# Patient Record
Sex: FEMALE | URBAN - METROPOLITAN AREA
[De-identification: names, ages, dates, MRNs, and addresses within clinical notes are randomized per-mention and may not be internally consistent; named-entity substitution may affect disease eponyms.]

---

## 2017-07-03 ENCOUNTER — EMERGENCY (EMERGENCY)
Facility: HOSPITAL | Age: 22
LOS: 1 days | Discharge: HOME | End: 2017-07-03
Attending: EMERGENCY MEDICINE | Admitting: EMERGENCY MEDICINE
Payer: MEDICAID

## 2017-07-03 VITALS
RESPIRATION RATE: 18 BRPM | OXYGEN SATURATION: 100 % | TEMPERATURE: 99 F | HEART RATE: 79 BPM | SYSTOLIC BLOOD PRESSURE: 106 MMHG | DIASTOLIC BLOOD PRESSURE: 73 MMHG

## 2017-07-03 PROCEDURE — 99283 EMERGENCY DEPT VISIT LOW MDM: CPT | Mod: 25

## 2017-07-03 PROCEDURE — 64400 NJX AA&/STRD TRIGEMINAL NRV: CPT | Mod: 26

## 2017-07-04 PROCEDURE — 64400 NJX AA&/STRD TRIGEMINAL NRV: CPT | Mod: LT

## 2017-07-04 PROCEDURE — 99283 EMERGENCY DEPT VISIT LOW MDM: CPT | Mod: 25

## 2017-07-04 RX ORDER — OXYCODONE HYDROCHLORIDE 5 MG/1
5 TABLET ORAL ONCE
Qty: 0 | Refills: 0 | Status: DISCONTINUED | OUTPATIENT
Start: 2017-07-04 | End: 2017-07-04

## 2017-07-04 RX ORDER — OXYCODONE HYDROCHLORIDE 5 MG/1
1 TABLET ORAL
Qty: 4 | Refills: 0 | OUTPATIENT
Start: 2017-07-04 | End: 2017-07-05

## 2017-07-04 RX ADMIN — OXYCODONE HYDROCHLORIDE 5 MILLIGRAM(S): 5 TABLET ORAL at 02:03

## 2017-07-04 NOTE — ED PROVIDER NOTE - PLAN OF CARE
Take Oxycodone 5mg 1 tablets every 6 hrs as needed for breakthrough discomfort- caution drowsiness while taking this medication- do not drive or operate heavy machinery. Take Motrin 600mg every 8hrs with food for pain Follow up with Dental Clinic (see below). Return to the emergency room for any worsening pain, fever, new symptoms or any other concern.     [facility]  8:30 a.m. to 4:30 p.m.   [address]  [phone]

## 2017-07-04 NOTE — ED PROVIDER NOTE - MEDICAL DECISION MAKING DETAILS
Tooth pain - large fely appreciated. low suspicion for abscess. Tooth pain - large fely appreciated. low suspicion for abscess.  Attending Celeste: 23 y/o female presenting with dental pain. no swelling or fluctuence seen to suggest abscess. no jaw discomfort. concern for dental caries. will pain control and have pt follow up with dental

## 2017-07-04 NOTE — ED PROVIDER NOTE - OBJECTIVE STATEMENT
21yo F co of dental pain. Pt states pain has had intermittent pain in lower left jaw that recently became constant and is increasing over the last day. No fevers or chills. Pt has no dentist currently but states she has a cavity in the area which she knows requires a root canal. Pt is otherwise well. No trismus, ear pain, pain with eye movement or facial swelling. No stridor or difficulty breathing. No neck swelling. Pt has taken tylenol and motrin without relief.

## 2017-07-04 NOTE — ED PROVIDER NOTE - CARE PLAN
Principal Discharge DX:	Odontalgia  Instructions for follow-up, activity and diet:	Take Oxycodone 5mg 1 tablets every 6 hrs as needed for breakthrough discomfort- caution drowsiness while taking this medication- do not drive or operate heavy machinery. Take Motrin 600mg every 8hrs with food for pain Follow up with Dental Clinic (see below). Return to the emergency room for any worsening pain, fever, new symptoms or any other concern.     [facility]  8:30 a.m. to 4:30 p.m.   [address] [place] [address]  [phone] Principal Discharge DX:	Odontalgia  Instructions for follow-up, activity and diet:	Take Oxycodone 5mg 1 tablets every 6 hrs as needed for breakthrough discomfort- caution drowsiness while taking this medication- do not drive or operate heavy machinery. Take Motrin 600mg every 8hrs with food for pain Follow up with Dental Clinic (see below). Return to the emergency room for any worsening pain, fever, new symptoms or any other concern.     [facility]  8:30 a.m. to 4:30 p.m.   [address] [place] [address]  [phone] Principal Discharge DX:	Odontalgia  Instructions for follow-up, activity and diet:	Take Oxycodone 5mg 1 tablets every 6 hrs as needed for breakthrough discomfort- caution drowsiness while taking this medication- do not drive or operate heavy machinery. Take Motrin 600mg every 8hrs with food for pain Follow up with Dental Clinic (see below). Return to the emergency room for any worsening pain, fever, new symptoms or any other concern.     [facility]  8:30 a.m. to 4:30 p.m.   [address] [place] [address]  [phone]

## 2017-07-04 NOTE — ED PROVIDER NOTE - PHYSICAL EXAMINATION
Mod distress 2/2 pain. NCAT. EOMI, PERRLA, dental fely on tooth twenty. No gingival erythema or swelling. OP without exudate or erythema. no tonsilar swelling. neck supple. ctabl. rrr Mod distress 2/2 pain. NCAT. EOMI, PERRLA, dental fely on tooth twenty. No gingival erythema or swelling. OP without exudate or erythema. no tonsilar swelling. neck supple. ctabl. rrr  Attending Celeste: Gen: NAD, heent: atrauamtic, eomi, perrla, mmm, , +dental caries to tooth 18, gum fullness to 19. to left lower op pink, uvula midline, neck; nttp, no nuchal rigidity, chest: nttp, no crepitus, cv: rrr, no murmurs, lungs: ctab, abd: soft, nontender, nondistended, no peritoneal signs, +BS, no guarding, ext: wwp, neg homans, skin: no rash, neuro: awake and alert, following commands, speech clear, sensation and strength intact, no focal deficits

## 2018-07-15 ENCOUNTER — TRANSCRIPTION ENCOUNTER (OUTPATIENT)
Age: 23
End: 2018-07-15

## 2022-04-04 PROBLEM — Z00.00 ENCOUNTER FOR PREVENTIVE HEALTH EXAMINATION: Status: ACTIVE | Noted: 2022-04-04

## 2022-04-06 ENCOUNTER — ASOB RESULT (OUTPATIENT)
Age: 27
End: 2022-04-06

## 2022-04-06 ENCOUNTER — TRANSCRIPTION ENCOUNTER (OUTPATIENT)
Age: 27
End: 2022-04-06

## 2022-04-06 ENCOUNTER — APPOINTMENT (OUTPATIENT)
Dept: ANTEPARTUM | Facility: CLINIC | Age: 27
End: 2022-04-06
Payer: MEDICAID

## 2022-04-06 PROCEDURE — 76813 OB US NUCHAL MEAS 1 GEST: CPT

## 2022-04-06 PROCEDURE — 76801 OB US < 14 WKS SINGLE FETUS: CPT | Mod: 59

## 2022-05-23 ENCOUNTER — APPOINTMENT (OUTPATIENT)
Dept: ANTEPARTUM | Facility: CLINIC | Age: 27
End: 2022-05-23

## 2022-05-23 ENCOUNTER — ASOB RESULT (OUTPATIENT)
Age: 27
End: 2022-05-23

## 2022-05-23 PROCEDURE — 76811 OB US DETAILED SNGL FETUS: CPT

## 2022-06-06 ENCOUNTER — ASOB RESULT (OUTPATIENT)
Age: 27
End: 2022-06-06

## 2022-06-06 ENCOUNTER — APPOINTMENT (OUTPATIENT)
Dept: ANTEPARTUM | Facility: CLINIC | Age: 27
End: 2022-06-06
Payer: MEDICAID

## 2022-06-06 PROCEDURE — 76816 OB US FOLLOW-UP PER FETUS: CPT

## 2022-08-22 ENCOUNTER — ASOB RESULT (OUTPATIENT)
Age: 27
End: 2022-08-22

## 2022-08-22 ENCOUNTER — APPOINTMENT (OUTPATIENT)
Dept: ANTEPARTUM | Facility: CLINIC | Age: 27
End: 2022-08-22

## 2022-08-22 PROCEDURE — 76818 FETAL BIOPHYS PROFILE W/NST: CPT

## 2022-08-22 PROCEDURE — 76816 OB US FOLLOW-UP PER FETUS: CPT

## 2022-08-22 PROCEDURE — 76820 UMBILICAL ARTERY ECHO: CPT

## 2022-08-22 PROCEDURE — 99202 OFFICE O/P NEW SF 15 MIN: CPT | Mod: TH,25

## 2022-09-02 ENCOUNTER — APPOINTMENT (OUTPATIENT)
Dept: ANTEPARTUM | Facility: CLINIC | Age: 27
End: 2022-09-02

## 2022-09-19 ENCOUNTER — ASOB RESULT (OUTPATIENT)
Age: 27
End: 2022-09-19

## 2022-09-19 ENCOUNTER — APPOINTMENT (OUTPATIENT)
Dept: ANTEPARTUM | Facility: CLINIC | Age: 27
End: 2022-09-19

## 2022-09-19 PROCEDURE — 76820 UMBILICAL ARTERY ECHO: CPT | Mod: 59

## 2022-09-19 PROCEDURE — 76818 FETAL BIOPHYS PROFILE W/NST: CPT

## 2022-09-19 PROCEDURE — 76816 OB US FOLLOW-UP PER FETUS: CPT | Mod: 59

## 2022-10-08 ENCOUNTER — INPATIENT (INPATIENT)
Facility: HOSPITAL | Age: 27
LOS: 2 days | Discharge: HOME | End: 2022-10-11
Attending: OBSTETRICS & GYNECOLOGY | Admitting: OBSTETRICS & GYNECOLOGY

## 2022-10-09 ENCOUNTER — TRANSCRIPTION ENCOUNTER (OUTPATIENT)
Age: 27
End: 2022-10-09

## 2022-10-09 VITALS — TEMPERATURE: 98 F

## 2022-10-09 DIAGNOSIS — Z90.89 ACQUIRED ABSENCE OF OTHER ORGANS: Chronic | ICD-10-CM

## 2022-10-09 DIAGNOSIS — Z98.890 OTHER SPECIFIED POSTPROCEDURAL STATES: Chronic | ICD-10-CM

## 2022-10-09 LAB
BASOPHILS # BLD AUTO: 0.03 K/UL — SIGNIFICANT CHANGE UP (ref 0–0.2)
BASOPHILS NFR BLD AUTO: 0.3 % — SIGNIFICANT CHANGE UP (ref 0–2)
BLD GP AB SCN SERPL QL: NEGATIVE — SIGNIFICANT CHANGE UP
COVID-19 SPIKE DOMAIN AB INTERP: POSITIVE
COVID-19 SPIKE DOMAIN ANTIBODY RESULT: >250 U/ML — HIGH
EOSINOPHIL # BLD AUTO: 0.04 K/UL — SIGNIFICANT CHANGE UP (ref 0–0.5)
EOSINOPHIL NFR BLD AUTO: 0.5 % — SIGNIFICANT CHANGE UP (ref 0–6)
HCT VFR BLD CALC: 35.3 % — SIGNIFICANT CHANGE UP (ref 34.5–45)
HGB BLD-MCNC: 10.9 G/DL — LOW (ref 11.5–15.5)
IANC: 5.8 K/UL — SIGNIFICANT CHANGE UP (ref 1.8–7.4)
IMM GRANULOCYTES NFR BLD AUTO: 0.5 % — SIGNIFICANT CHANGE UP (ref 0–0.9)
LYMPHOCYTES # BLD AUTO: 1.79 K/UL — SIGNIFICANT CHANGE UP (ref 1–3.3)
LYMPHOCYTES # BLD AUTO: 20.8 % — SIGNIFICANT CHANGE UP (ref 13–44)
MCHC RBC-ENTMCNC: 26.1 PG — LOW (ref 27–34)
MCHC RBC-ENTMCNC: 30.9 GM/DL — LOW (ref 32–36)
MCV RBC AUTO: 84.7 FL — SIGNIFICANT CHANGE UP (ref 80–100)
MONOCYTES # BLD AUTO: 0.89 K/UL — SIGNIFICANT CHANGE UP (ref 0–0.9)
MONOCYTES NFR BLD AUTO: 10.4 % — SIGNIFICANT CHANGE UP (ref 2–14)
NEUTROPHILS # BLD AUTO: 5.8 K/UL — SIGNIFICANT CHANGE UP (ref 1.8–7.4)
NEUTROPHILS NFR BLD AUTO: 67.5 % — SIGNIFICANT CHANGE UP (ref 43–77)
NRBC # BLD: 0 /100 WBCS — SIGNIFICANT CHANGE UP (ref 0–0)
NRBC # FLD: 0 K/UL — SIGNIFICANT CHANGE UP (ref 0–0)
PLATELET # BLD AUTO: 207 K/UL — SIGNIFICANT CHANGE UP (ref 150–400)
RBC # BLD: 4.17 M/UL — SIGNIFICANT CHANGE UP (ref 3.8–5.2)
RBC # FLD: 15.5 % — HIGH (ref 10.3–14.5)
RH IG SCN BLD-IMP: POSITIVE — SIGNIFICANT CHANGE UP
RH IG SCN BLD-IMP: POSITIVE — SIGNIFICANT CHANGE UP
SARS-COV-2 IGG+IGM SERPL QL IA: >250 U/ML — HIGH
SARS-COV-2 IGG+IGM SERPL QL IA: POSITIVE
SARS-COV-2 RNA SPEC QL NAA+PROBE: SIGNIFICANT CHANGE UP
T PALLIDUM AB TITR SER: NEGATIVE — SIGNIFICANT CHANGE UP
WBC # BLD: 8.59 K/UL — SIGNIFICANT CHANGE UP (ref 3.8–10.5)
WBC # FLD AUTO: 8.59 K/UL — SIGNIFICANT CHANGE UP (ref 3.8–10.5)

## 2022-10-09 RX ORDER — BENZOCAINE 10 %
1 GEL (GRAM) MUCOUS MEMBRANE EVERY 6 HOURS
Refills: 0 | Status: DISCONTINUED | OUTPATIENT
Start: 2022-10-09 | End: 2022-10-11

## 2022-10-09 RX ORDER — CITRIC ACID/SODIUM CITRATE 300-500 MG
15 SOLUTION, ORAL ORAL EVERY 6 HOURS
Refills: 0 | Status: DISCONTINUED | OUTPATIENT
Start: 2022-10-09 | End: 2022-10-09

## 2022-10-09 RX ORDER — OXYCODONE HYDROCHLORIDE 5 MG/1
5 TABLET ORAL ONCE
Refills: 0 | Status: DISCONTINUED | OUTPATIENT
Start: 2022-10-09 | End: 2022-10-11

## 2022-10-09 RX ORDER — AER TRAVELER 0.5 G/1
1 SOLUTION RECTAL; TOPICAL EVERY 4 HOURS
Refills: 0 | Status: DISCONTINUED | OUTPATIENT
Start: 2022-10-09 | End: 2022-10-11

## 2022-10-09 RX ORDER — OXYCODONE HYDROCHLORIDE 5 MG/1
5 TABLET ORAL
Refills: 0 | Status: DISCONTINUED | OUTPATIENT
Start: 2022-10-09 | End: 2022-10-11

## 2022-10-09 RX ORDER — OXYTOCIN 10 UNIT/ML
333.33 VIAL (ML) INJECTION
Qty: 20 | Refills: 0 | Status: DISCONTINUED | OUTPATIENT
Start: 2022-10-09 | End: 2022-10-09

## 2022-10-09 RX ORDER — OXYTOCIN 10 UNIT/ML
333.33 VIAL (ML) INJECTION
Qty: 20 | Refills: 0 | Status: DISCONTINUED | OUTPATIENT
Start: 2022-10-09 | End: 2022-10-11

## 2022-10-09 RX ORDER — MAGNESIUM HYDROXIDE 400 MG/1
30 TABLET, CHEWABLE ORAL
Refills: 0 | Status: DISCONTINUED | OUTPATIENT
Start: 2022-10-09 | End: 2022-10-11

## 2022-10-09 RX ORDER — SODIUM CHLORIDE 9 MG/ML
3 INJECTION INTRAMUSCULAR; INTRAVENOUS; SUBCUTANEOUS EVERY 8 HOURS
Refills: 0 | Status: DISCONTINUED | OUTPATIENT
Start: 2022-10-09 | End: 2022-10-11

## 2022-10-09 RX ORDER — PRAMOXINE HYDROCHLORIDE 150 MG/15G
1 AEROSOL, FOAM RECTAL EVERY 4 HOURS
Refills: 0 | Status: DISCONTINUED | OUTPATIENT
Start: 2022-10-09 | End: 2022-10-11

## 2022-10-09 RX ORDER — LANOLIN
1 OINTMENT (GRAM) TOPICAL EVERY 6 HOURS
Refills: 0 | Status: DISCONTINUED | OUTPATIENT
Start: 2022-10-09 | End: 2022-10-11

## 2022-10-09 RX ORDER — TETANUS TOXOID, REDUCED DIPHTHERIA TOXOID AND ACELLULAR PERTUSSIS VACCINE, ADSORBED 5; 2.5; 8; 8; 2.5 [IU]/.5ML; [IU]/.5ML; UG/.5ML; UG/.5ML; UG/.5ML
0.5 SUSPENSION INTRAMUSCULAR ONCE
Refills: 0 | Status: DISCONTINUED | OUTPATIENT
Start: 2022-10-09 | End: 2022-10-11

## 2022-10-09 RX ORDER — SODIUM CHLORIDE 9 MG/ML
1000 INJECTION, SOLUTION INTRAVENOUS
Refills: 0 | Status: DISCONTINUED | OUTPATIENT
Start: 2022-10-09 | End: 2022-10-09

## 2022-10-09 RX ORDER — KETOROLAC TROMETHAMINE 30 MG/ML
30 SYRINGE (ML) INJECTION ONCE
Refills: 0 | Status: DISCONTINUED | OUTPATIENT
Start: 2022-10-09 | End: 2022-10-09

## 2022-10-09 RX ORDER — ACETAMINOPHEN 500 MG
975 TABLET ORAL
Refills: 0 | Status: DISCONTINUED | OUTPATIENT
Start: 2022-10-09 | End: 2022-10-11

## 2022-10-09 RX ORDER — IBUPROFEN 200 MG
600 TABLET ORAL EVERY 6 HOURS
Refills: 0 | Status: DISCONTINUED | OUTPATIENT
Start: 2022-10-09 | End: 2022-10-11

## 2022-10-09 RX ORDER — BUTORPHANOL TARTRATE 2 MG/ML
2 INJECTION, SOLUTION INTRAMUSCULAR; INTRAVENOUS ONCE
Refills: 0 | Status: DISCONTINUED | OUTPATIENT
Start: 2022-10-09 | End: 2022-10-09

## 2022-10-09 RX ORDER — CHLORHEXIDINE GLUCONATE 213 G/1000ML
1 SOLUTION TOPICAL ONCE
Refills: 0 | Status: DISCONTINUED | OUTPATIENT
Start: 2022-10-09 | End: 2022-10-09

## 2022-10-09 RX ORDER — DIPHENHYDRAMINE HCL 50 MG
25 CAPSULE ORAL EVERY 6 HOURS
Refills: 0 | Status: DISCONTINUED | OUTPATIENT
Start: 2022-10-09 | End: 2022-10-11

## 2022-10-09 RX ORDER — SIMETHICONE 80 MG/1
80 TABLET, CHEWABLE ORAL EVERY 4 HOURS
Refills: 0 | Status: DISCONTINUED | OUTPATIENT
Start: 2022-10-09 | End: 2022-10-11

## 2022-10-09 RX ORDER — IBUPROFEN 200 MG
600 TABLET ORAL EVERY 6 HOURS
Refills: 0 | Status: COMPLETED | OUTPATIENT
Start: 2022-10-09 | End: 2023-09-07

## 2022-10-09 RX ORDER — HYDROCORTISONE 1 %
1 OINTMENT (GRAM) TOPICAL EVERY 6 HOURS
Refills: 0 | Status: DISCONTINUED | OUTPATIENT
Start: 2022-10-09 | End: 2022-10-11

## 2022-10-09 RX ORDER — DIBUCAINE 1 %
1 OINTMENT (GRAM) RECTAL EVERY 6 HOURS
Refills: 0 | Status: DISCONTINUED | OUTPATIENT
Start: 2022-10-09 | End: 2022-10-11

## 2022-10-09 RX ADMIN — BUTORPHANOL TARTRATE 2 MILLIGRAM(S): 2 INJECTION, SOLUTION INTRAMUSCULAR; INTRAVENOUS at 10:20

## 2022-10-09 RX ADMIN — Medication 30 MILLIGRAM(S): at 20:44

## 2022-10-09 RX ADMIN — SODIUM CHLORIDE 125 MILLILITER(S): 9 INJECTION, SOLUTION INTRAVENOUS at 18:11

## 2022-10-09 RX ADMIN — Medication 30 MILLIGRAM(S): at 20:24

## 2022-10-09 RX ADMIN — BUTORPHANOL TARTRATE 2 MILLIGRAM(S): 2 INJECTION, SOLUTION INTRAMUSCULAR; INTRAVENOUS at 04:30

## 2022-10-09 RX ADMIN — Medication 1000 MILLIUNIT(S)/MIN: at 20:44

## 2022-10-09 RX ADMIN — SODIUM CHLORIDE 125 MILLILITER(S): 9 INJECTION, SOLUTION INTRAVENOUS at 04:02

## 2022-10-09 RX ADMIN — BUTORPHANOL TARTRATE 2 MILLIGRAM(S): 2 INJECTION, SOLUTION INTRAMUSCULAR; INTRAVENOUS at 04:02

## 2022-10-09 NOTE — OB PROVIDER LABOR PROGRESS NOTE - NS_SUBJECTIVE/OBJECTIVE_OBGYN_ALL_OB_FT
Patient seen and examined at bedside. Patient reports feeling constant pelvic pressure. Declined epidural at this time.

## 2022-10-09 NOTE — DISCHARGE NOTE OB - PATIENT PORTAL LINK FT
You can access the FollowMyHealth Patient Portal offered by [facility] by registering at the following website: [email or web address]. By joining [facility]’s FollowMyHealth portal, you will also be able to view your health information using other applications (apps) compatible with our system.

## 2022-10-09 NOTE — CHART NOTE - NSCHARTNOTEFT_GEN_A_CORE
PGY3 Labor & Delivery Progress Note     Pt seen & examined at bedside. Cervical balloon placed w/o incidence.    SVE: 2/70/-3  EFM: 130/mod. variability/+accles/-decels  [place]: irregular    T(C): 36.8 (10-09-22 @ 00:06), Max: 36.8 (10-09-22 @ 00:00)  HR: 70 (10-09-22 @ 02:01) (70 - 78)  BP: 120/73 (10-09-22 @ 02:01) (111/60 - 120/73)  RR: 16 (10-09-22 @ 00:06) (16 - 16)  SpO2: --      Plan:    - Con't IOL w/ PO cytotec and Cervical balloon  - Continuous EFM, [place]  - Con't IVF    D/W attending physician Dr. Tricia Jett MD  PGY-3

## 2022-10-09 NOTE — OB RN PATIENT PROFILE - PAIN RATING/NUMBER SCALE (0-10): REST
Wellness Visit- no show    Date: 11/21/2019      Patient was discharged from therapy due to poor attendance.   We decided to continue wellness with health  to maintain strength and encourage continued exercise program without skilled intervention of therapist.   Patient no showed.  No further visits scheduled.   Noted patient in urgent care for URI.   Will await patient to call to reschedule, but in light of attendance history,  13 cancels and no shows in total from 2/1/19, we will not continue to call to reschedule.    Conchis Cabral,  PT   5

## 2022-10-09 NOTE — OB PERINATAL HUDDLE NOTE - NS_HUDDLEPLAN_OBGYN_ALL_OB_FT
- c/w resuscitation  - fetus responsive to scalp stim -> moderate variability and accels  - will continue to monitor closely  - re examine in 2hours

## 2022-10-09 NOTE — DISCHARGE NOTE OB - MEDICATION SUMMARY - MEDICATIONS TO TAKE
I will START or STAY ON the medications listed below when I get home from the hospital:  None I will START or STAY ON the medications listed below when I get home from the hospital:    ibuprofen 600 mg oral tablet  -- 1 tab(s) by mouth every 6 hours, As Needed  -- Indication: For  (spontaneous vaginal delivery)

## 2022-10-09 NOTE — DISCHARGE NOTE OB - MATERIALS PROVIDED
Post Partum Education Materials Post Partum Education Materials/Vaccinations/[facility] [place] Screening Program/  Immunization Record/Breastfeeding Log/Bottle Feeding Log/Breastfeeding Mother’s Support Group Information/Guide to Postpartum Care/[facility] Hearing Screen Program/Back To Sleep Handout/Shaken Baby Prevention Handout/Breastfeeding Guide and Packet/Birth Certificate Instructions

## 2022-10-09 NOTE — OB PROVIDER H&P - ASSESSMENT
A/P: 27y  at 39w6d GA who presents to L&D for IOL for IUGR.  -Admit to L&D  -Consents signed  -Admission labs  -CLD  -IV fluids  -IOL with PO cytotec and cervical balloon  -Fetus: Cat I tracing. Continuous toco and fetal monitoring.   -GBS: Negative, no GBS ppx required     Discussed with Dr. Tricia De La Rosa PGY1

## 2022-10-09 NOTE — OB PROVIDER H&P - PRETERM DELIVERIES, OB PROFILE
[facility]  [address], [address]  [place] [address]  [phone]        June 28, 2017    Rosette Aguilar  [id number] [facility] [address]            Dear MsAna Rosette STEWARTAna Aguilar:      The results of your recent lab results were NORMAL.  If you have any further questions or concerns, please contact our office.    Results for orders placed or performed in visit on 06/13/17   Glucose tolerance gest screen 1 hour   Result Value Ref Range    Glu Gest Screen 1hr 50g 124 60 - 129 mg/dL         Sincerely,      Allyson Church M.D.  
0

## 2022-10-09 NOTE — DISCHARGE NOTE OB - CARE PROVIDER_API CALL
Chantelle Louise)  [address], [address] [place] [address]  Phone: [phone]  Fax: [phone]  Follow Up Time:

## 2022-10-09 NOTE — OB NEONATOLOGY/PEDIATRICIAN DELIVERY SUMMARY - NSPEDSNEONOTESA_OBGYN_ALL_OB_FT
Baby is a 39+6 wk GA male born to a 26 y/o  mother via . PEDS called to delivery for cat II tracing, IUGR. Maternal history uncomplicated. Prenatal history uncomplicated. Maternal blood type O+. PNL negative, non-reactive, and immune. GBS negative 10/4. AROM at 13:34 on 10/9, clear fluids. Baby born vigorous and crying spontaneously. Warmed, dried, stimulated. Apgars . 0.11. Mom plans to breastfeed and consents hepB. Circ requested.   BW: 2940  : 10/9/2022  TOB: 19:42    Physical Exam:  Gen: NAD, +grimace  HEENT: anterior fontanel open soft and flat, no cleft lip/palate, ears normal set, no ear pits or tags. nares clinically patent  Resp: no increased work of breathing  Cardio: regular rate and rhythm  Abd: soft, umbilical cord with 3 vessels  Back: spine midline, no sacral dimple or tuft of hair  Neuro: +grasp/suck/renata/palmar/plantar, normal tone  Extremities: moving all extremities, full range of motion x 4, no crepitus  Skin: pink, warm  Genitals: Normal male anatomy, testicles palpable in scrotum b/l, Ayan 1, anus patent

## 2022-10-09 NOTE — OB PROVIDER H&P - HISTORY OF PRESENT ILLNESS
27y  at 39w6d GA who presents to L&D for IOL for IUGR. Patient denies vaginal bleeding, contractions and leakage of fluid. She endorses good fetal movement. No other complaints at this time.     MADIHA: 10/10/2022    Prenatal course significant for IUGR, otherwise uncomplicated.      Obhx:  - 2020: TOP w/d&c  Gynhx: -fibroids, -ovarian cysts, denies STD hx, denies abnormal PAPs   PMH: Denies  PSH:   - d&c - 2020  - tonsillectomy as a child  Soc hx: Denies EtOH, tobacco and illicit drug use during this pregnancy  Anx/dep:  Denies  Meds: PNV, ASA81  Allergies: NKDA  GBS: negative  EFW: 2944    Will accept blood transfusion

## 2022-10-09 NOTE — OB RN DELIVERY SUMMARY - NSSELHIDDEN_OBGYN_ALL_OB_FT
[NS_DeliveryAttending1_OBGYN_ALL_OB_FT:[id number]],[NS_DeliveryRN_OBGYN_ALL_OB_FT:[id number]]

## 2022-10-09 NOTE — OB RN DELIVERY SUMMARY - NS_SEPSISRSKCALC_OBGYN_ALL_OB_FT
EOS calculated successfully. EOS Risk Factor: 0.5/1000 live births ([facility] national incidence); GA=39w6d; Temp=99.1; ROM=6.133; GBS='Negative'; Antibiotics='No antibiotics or any antibiotics < 2 hrs prior to birth'

## 2022-10-09 NOTE — OB PROVIDER H&P - NSHPPHYSICALEXAM_GEN_ALL_CORE
T(C): 36.8 (10-09-22 @ 00:06), Max: 36.8 (10-09-22 @ 00:00)  HR: 78 (10-09-22 @ 00:10) (78 - 78)  BP: 111/60 (10-09-22 @ 00:10) (111/60 - 111/60)  RR: 16 (10-09-22 @ 00:06) (16 - 16)  SpO2: --    Gen: NAD, well-appearing, AAOx3   Abd: Soft, gravid  Ext: non-tender, non-edematous  SVE:  2/70/-2  Bedside sono: vertex  FHT: 135bpm, moderate variability, +accels, no decels  [place]: q2-5
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
## 2022-10-09 NOTE — OB PROVIDER DELIVERY SUMMARY - NSPROVIDERDELIVERYNOTE_OBGYN_ALL_OB_FT
of liveborn male  in TJ position over intact perineum to sterile field. Right anterior shoulder delivered without difficulty. Delayed cord clamping performed, apgars 9/9, weight 6#7oz. Placenta , intact. Bilateral periurethral and 2nd deg laceration repaired with good reapproximation and hemostasis. No other lacerations noted. Firm fundus and good hemostasis at end.

## 2022-10-10 ENCOUNTER — APPOINTMENT (OUTPATIENT)
Dept: ANTEPARTUM | Facility: CLINIC | Age: 27
End: 2022-10-10

## 2022-10-10 RX ADMIN — SODIUM CHLORIDE 3 MILLILITER(S): 9 INJECTION INTRAMUSCULAR; INTRAVENOUS; SUBCUTANEOUS at 11:30

## 2022-10-10 RX ADMIN — Medication 600 MILLIGRAM(S): at 06:37

## 2022-10-10 RX ADMIN — Medication 600 MILLIGRAM(S): at 17:12

## 2022-10-10 RX ADMIN — Medication 600 MILLIGRAM(S): at 16:39

## 2022-10-10 RX ADMIN — Medication 975 MILLIGRAM(S): at 04:01

## 2022-10-10 RX ADMIN — Medication 600 MILLIGRAM(S): at 01:39

## 2022-10-10 RX ADMIN — Medication 975 MILLIGRAM(S): at 22:23

## 2022-10-10 RX ADMIN — Medication 600 MILLIGRAM(S): at 02:12

## 2022-10-10 RX ADMIN — Medication 975 MILLIGRAM(S): at 10:30

## 2022-10-10 RX ADMIN — Medication 1 TABLET(S): at 11:48

## 2022-10-10 RX ADMIN — Medication 975 MILLIGRAM(S): at 04:35

## 2022-10-10 RX ADMIN — Medication 975 MILLIGRAM(S): at 09:56

## 2022-10-10 RX ADMIN — Medication 975 MILLIGRAM(S): at 21:53

## 2022-10-10 RX ADMIN — SODIUM CHLORIDE 3 MILLILITER(S): 9 INJECTION INTRAMUSCULAR; INTRAVENOUS; SUBCUTANEOUS at 23:19

## 2022-10-10 NOTE — LACTATION INITIAL EVALUATION - BABY A SEX
Male Colchicine Counseling:  Patient counseled regarding adverse effects including but not limited to stomach upset (nausea, vomiting, stomach pain, or diarrhea).  Patient instructed to limit alcohol consumption while taking this medication.  Colchicine may reduce blood counts especially with prolonged use.  The patient understands that monitoring of kidney function and blood counts may be required, especially at baseline. The patient verbalized understanding of the proper use and possible adverse effects of colchicine.  All of the patient's questions and concerns were addressed.

## 2022-10-10 NOTE — PROGRESS NOTE ADULT - SUBJECTIVE AND OBJECTIVE BOX
Post-partum Note,   She is a  27y woman who is now post-partum day: 1    Subjective:  The patient feels well.  She is ambulating.   She is tolerating regular diet.  She denies nausea and vomiting; denies fever.  She is voiding.  Her pain is controlled.  She reports normal postpartum bleeding.  She is breastfeeding.  She is formula feeding.  She is bonding with infant.    Physical exam:    Vital Signs Last 24 Hrs  T(C): 37.2 (10 Oct 2022 01:55), Max: 37.3 (09 Oct 2022 20:25)  T(F): 98.9 (10 Oct 2022 01:55), Max: 99.1 (09 Oct 2022 20:25)  HR: 82 (10 Oct 2022 01:55) (61 - 176)  BP: 107/71 (10 Oct 2022 01:55) (100/60 - 201/148)  BP(mean): --  RR: 17 (10 Oct 2022 01:55) (16 - 17)  SpO2: 97% (10 Oct 2022 01:55) (68% - 100%)    Parameters below as of 09 Oct 2022 23:20  Patient On (Oxygen Delivery Method): room air        Gen: NAD  Breast: Soft, nontender, not engorged.  Abdomen: Soft, nontender, no distension , firm uterine fundus at umbilicus.  Pelvic: Normal lochia noted  Ext: No calf tenderness    LABS:                        10.9   8.59  )-----------( 207      ( 09 Oct 2022 01:30 )             35.3       Rubella status:     Allergies    No Known Allergies    Intolerances      MEDICATIONS  (STANDING):  acetaminophen     Tablet .. 975 milliGRAM(s) Oral <User Schedule>  diphtheria/tetanus/pertussis (acellular) Vaccine (ADAcel) 0.5 milliLiter(s) IntraMuscular once  ibuprofen  Tablet. 600 milliGRAM(s) Oral every 6 hours  oxytocin Infusion 333.333 milliUNIT(s)/Min (1000 mL/Hr) IV Continuous <Continuous>  prenatal multivitamin 1 Tablet(s) Oral daily  sodium chloride 0.9% lock flush 3 milliLiter(s) IV Push every 8 hours    MEDICATIONS  (PRN):  benzocaine 20%/menthol 0.5% Spray 1 Spray(s) Topical every 6 hours PRN for Perineal discomfort  dibucaine 1% Ointment 1 Application(s) Topical every 6 hours PRN Perineal discomfort  diphenhydrAMINE 25 milliGRAM(s) Oral every 6 hours PRN Pruritus  hydrocortisone 1% Cream 1 Application(s) Topical every 6 hours PRN Moderate Pain (4-6)  lanolin Ointment 1 Application(s) Topical every 6 hours PRN nipple soreness  magnesium hydroxide Suspension 30 milliLiter(s) Oral two times a day PRN Constipation  oxyCODONE    IR 5 milliGRAM(s) Oral every 3 hours PRN Moderate to Severe Pain (4-10)  oxyCODONE    IR 5 milliGRAM(s) Oral once PRN Moderate to Severe Pain (4-10)  pramoxine 1%/zinc 5% Cream 1 Application(s) Topical every 4 hours PRN Moderate Pain (4-6)  simethicone 80 milliGRAM(s) Chew every 4 hours PRN Gas  witch hazel Pads 1 Application(s) Topical every 4 hours PRN Perineal discomfort

## 2022-10-10 NOTE — PROGRESS NOTE ADULT - SUBJECTIVE AND OBJECTIVE BOX
Anesthesia Post-op Note    POD#1 S/P C/S    Patient is doing well.  OOBAA. Tolerating clears.  Pain is tolerable.  No residual anesthetic issues or complications noted.      Anali Chauhan CRNA

## 2022-10-10 NOTE — PROGRESS NOTE ADULT - ASSESSMENT
Assessment and Plan  PPD #1 s/p     Doing well.  Increase ambulation.  Encourage breastfeeding.  Continue pain medication PRN, Tylenol, Motrin, Oxycodone  PP & PPD Instructions reviewed.  PP educational materials reviewed & provided     D/C home tomorrow.    Discussed with MD Elena Rosneberg

## 2022-10-11 VITALS
HEART RATE: 79 BPM | SYSTOLIC BLOOD PRESSURE: 115 MMHG | TEMPERATURE: 98 F | OXYGEN SATURATION: 100 % | DIASTOLIC BLOOD PRESSURE: 71 MMHG

## 2022-10-11 RX ORDER — IBUPROFEN 200 MG
1 TABLET ORAL
Qty: 0 | Refills: 0 | DISCHARGE
Start: 2022-10-11

## 2022-10-11 NOTE — PROGRESS NOTE ADULT - SUBJECTIVE AND OBJECTIVE BOX
Post-partum Note,   She is a  27y woman who is now post-partum day: 2    Subjective:  The patient feels well.  She is ambulating.   She is tolerating regular diet.  She denies nausea and vomiting; denies fever.  She is voiding.  Her pain is controlled.  She reports normal postpartum bleeding.  She is breastfeeding.  She is bonding with baby.     Physical exam:    Vital Signs Last 24 Hrs  T(C): 36.8 (11 Oct 2022 06:00), Max: 36.8 (11 Oct 2022 06:00)  T(F): 98.3 (11 Oct 2022 06:00), Max: 98.3 (11 Oct 2022 06:00)  HR: 66 (11 Oct 2022 06:00) (66 - 71)  BP: 95/60 (11 Oct 2022 06:00) (95/60 - 107/67)  BP(mean): --  RR: 16 (11 Oct 2022 06:00) (16 - 18)  SpO2: 100% (11 Oct 2022 06:00) (100% - 100%)        Gen: NAD  Breast: Soft, nontender, not engorged.  Abdomen: Soft, nontender, no distension , firm uterine fundus at umbilicus.  Pelvic: Normal lochia noted  Ext: No calf tenderness    LABS:      Rubella status:     Allergies    No Known Allergies    Intolerances      MEDICATIONS  (STANDING):  acetaminophen     Tablet .. 975 milliGRAM(s) Oral <User Schedule>  diphtheria/tetanus/pertussis (acellular) Vaccine (ADAcel) 0.5 milliLiter(s) IntraMuscular once  ibuprofen  Tablet. 600 milliGRAM(s) Oral every 6 hours  oxytocin Infusion 333.333 milliUNIT(s)/Min (1000 mL/Hr) IV Continuous <Continuous>  prenatal multivitamin 1 Tablet(s) Oral daily  sodium chloride 0.9% lock flush 3 milliLiter(s) IV Push every 8 hours    MEDICATIONS  (PRN):  benzocaine 20%/menthol 0.5% Spray 1 Spray(s) Topical every 6 hours PRN for Perineal discomfort  dibucaine 1% Ointment 1 Application(s) Topical every 6 hours PRN Perineal discomfort  diphenhydrAMINE 25 milliGRAM(s) Oral every 6 hours PRN Pruritus  hydrocortisone 1% Cream 1 Application(s) Topical every 6 hours PRN Moderate Pain (4-6)  lanolin Ointment 1 Application(s) Topical every 6 hours PRN nipple soreness  magnesium hydroxide Suspension 30 milliLiter(s) Oral two times a day PRN Constipation  oxyCODONE    IR 5 milliGRAM(s) Oral every 3 hours PRN Moderate to Severe Pain (4-10)  oxyCODONE    IR 5 milliGRAM(s) Oral once PRN Moderate to Severe Pain (4-10)  pramoxine 1%/zinc 5% Cream 1 Application(s) Topical every 4 hours PRN Moderate Pain (4-6)  simethicone 80 milliGRAM(s) Chew every 4 hours PRN Gas  witch hazel Pads 1 Application(s) Topical every 4 hours PRN Perineal discomfort        Assessment and Plan  PPD #2 s/p   Doing well.  PP & PPD Instructions reviewed.  PP education materials provided.  Pediatrician list provided.   Encouraged breast feeding.   D/C home today.  Plan to follow up at [facility] for PP visit in 6 weeks.   Plan reviewed with Elena Brown.              Post-partum Note,   She is a  27y woman who is now post-partum day: 2    Subjective:  The patient feels well.  She is ambulating.   She is tolerating regular diet.  She denies nausea and vomiting; denies fever.  She is voiding.  Her pain is controlled.  She reports normal postpartum bleeding.  She is breastfeeding.  She is bonding with baby.   Denies headaches, blurry vision, abdominal pain.     Physical exam:    Vital Signs Last 24 Hrs  T(C): 36.8 (11 Oct 2022 06:00), Max: 36.8 (11 Oct 2022 06:00)  T(F): 98.3 (11 Oct 2022 06:00), Max: 98.3 (11 Oct 2022 06:00)  HR: 66 (11 Oct 2022 06:00) (66 - 71)  BP: 95/60 (11 Oct 2022 06:00) (95/60 - 107/67)  BP(mean): --  RR: 16 (11 Oct 2022 06:00) (16 - 18)  SpO2: 100% (11 Oct 2022 06:00) (100% - 100%)        Gen: NAD  Breast: Soft, nontender, not engorged.  Abdomen: Soft, nontender, no distension , firm uterine fundus at umbilicus.  Pelvic: Normal lochia noted  Ext: No calf tenderness    LABS:      Rubella status:     Allergies    No Known Allergies    Intolerances      MEDICATIONS  (STANDING):  acetaminophen     Tablet .. 975 milliGRAM(s) Oral <User Schedule>  diphtheria/tetanus/pertussis (acellular) Vaccine (ADAcel) 0.5 milliLiter(s) IntraMuscular once  ibuprofen  Tablet. 600 milliGRAM(s) Oral every 6 hours  oxytocin Infusion 333.333 milliUNIT(s)/Min (1000 mL/Hr) IV Continuous <Continuous>  prenatal multivitamin 1 Tablet(s) Oral daily  sodium chloride 0.9% lock flush 3 milliLiter(s) IV Push every 8 hours    MEDICATIONS  (PRN):  benzocaine 20%/menthol 0.5% Spray 1 Spray(s) Topical every 6 hours PRN for Perineal discomfort  dibucaine 1% Ointment 1 Application(s) Topical every 6 hours PRN Perineal discomfort  diphenhydrAMINE 25 milliGRAM(s) Oral every 6 hours PRN Pruritus  hydrocortisone 1% Cream 1 Application(s) Topical every 6 hours PRN Moderate Pain (4-6)  lanolin Ointment 1 Application(s) Topical every 6 hours PRN nipple soreness  magnesium hydroxide Suspension 30 milliLiter(s) Oral two times a day PRN Constipation  oxyCODONE    IR 5 milliGRAM(s) Oral every 3 hours PRN Moderate to Severe Pain (4-10)  oxyCODONE    IR 5 milliGRAM(s) Oral once PRN Moderate to Severe Pain (4-10)  pramoxine 1%/zinc 5% Cream 1 Application(s) Topical every 4 hours PRN Moderate Pain (4-6)  simethicone 80 milliGRAM(s) Chew every 4 hours PRN Gas  witch hazel Pads 1 Application(s) Topical every 4 hours PRN Perineal discomfort        Assessment and Plan  PPD #2 s/p   Doing well.  PP & PPD Instructions reviewed.  PP education materials provided.  Pediatrician list provided.   Encouraged breast feeding.   D/C home today.  Plan to [facility] up at [facility] for PP visit in 6 weeks.   Plan reviewed with Elena Brown.